# Patient Record
Sex: MALE | Race: BLACK OR AFRICAN AMERICAN | Employment: FULL TIME | URBAN - METROPOLITAN AREA
[De-identification: names, ages, dates, MRNs, and addresses within clinical notes are randomized per-mention and may not be internally consistent; named-entity substitution may affect disease eponyms.]

---

## 2018-12-20 ENCOUNTER — APPOINTMENT (OUTPATIENT)
Dept: CT IMAGING | Age: 43
End: 2018-12-20
Attending: EMERGENCY MEDICINE
Payer: COMMERCIAL

## 2018-12-20 ENCOUNTER — HOSPITAL ENCOUNTER (OUTPATIENT)
Age: 43
Discharge: HOME | End: 2018-12-20
Attending: EMERGENCY MEDICINE
Payer: COMMERCIAL

## 2018-12-20 VITALS
SYSTOLIC BLOOD PRESSURE: 129 MMHG | HEART RATE: 94 BPM | DIASTOLIC BLOOD PRESSURE: 83 MMHG | RESPIRATION RATE: 16 BRPM | TEMPERATURE: 97 F | OXYGEN SATURATION: 100 %

## 2018-12-20 DIAGNOSIS — N39.0 URINARY TRACT INFECTION WITH HEMATURIA, SITE UNSPECIFIED: Primary | ICD-10-CM

## 2018-12-20 DIAGNOSIS — R31.9 URINARY TRACT INFECTION WITH HEMATURIA, SITE UNSPECIFIED: Primary | ICD-10-CM

## 2018-12-20 PROCEDURE — 99204 OFFICE O/P NEW MOD 45 MIN: CPT

## 2018-12-20 PROCEDURE — 87086 URINE CULTURE/COLONY COUNT: CPT | Performed by: EMERGENCY MEDICINE

## 2018-12-20 PROCEDURE — 81002 URINALYSIS NONAUTO W/O SCOPE: CPT | Performed by: EMERGENCY MEDICINE

## 2018-12-20 RX ORDER — KETOROLAC TROMETHAMINE 30 MG/ML
30 INJECTION, SOLUTION INTRAMUSCULAR; INTRAVENOUS ONCE
Status: DISCONTINUED | OUTPATIENT
Start: 2018-12-20 | End: 2018-12-20

## 2018-12-20 RX ORDER — SULFAMETHOXAZOLE AND TRIMETHOPRIM 800; 160 MG/1; MG/1
1 TABLET ORAL 2 TIMES DAILY
Qty: 6 TABLET | Refills: 0 | Status: SHIPPED | OUTPATIENT
Start: 2018-12-20 | End: 2018-12-23

## 2018-12-21 NOTE — ED INITIAL ASSESSMENT (HPI)
C/o urinary burning sensation at the end of the stream since Monday. Noted some blood in the urine on Monday and today.  Left groin pain at the end of urinary stream.

## 2018-12-21 NOTE — ED PROVIDER NOTES
Patient Seen in: [facility] [place] [facility]    History   Patient presents with:  Urinary Symptoms (urologic)    Stated Complaint: painful urination    HPI    This is a 25-year-old male that complains of dysuria since Monday.   Patient states at nondistended. Normoactive bowel sounds. No rebound. No guarding. : Circumcised male. No penile rash or lesions. No testicular pain or swelling. Minimal left angular lymph lymphadenopathy. EXTREMITIES: Warm with brisk capillary refill.        ED [employer]

## 2024-10-03 ENCOUNTER — EKG ENCOUNTER (OUTPATIENT)
Dept: LAB | Age: 49
End: 2024-10-03
Attending: NURSE PRACTITIONER
Payer: MEDICAID

## 2024-10-03 DIAGNOSIS — R00.2 PALPITATIONS: Primary | ICD-10-CM

## 2024-10-03 PROCEDURE — 93005 ELECTROCARDIOGRAM TRACING: CPT

## 2024-10-03 PROCEDURE — 93010 ELECTROCARDIOGRAM REPORT: CPT | Performed by: INTERNAL MEDICINE

## 2024-10-04 LAB
ATRIAL RATE: 90 BPM
P AXIS: 53 DEGREES
P-R INTERVAL: 170 MS
Q-T INTERVAL: 318 MS
QRS DURATION: 78 MS
QTC CALCULATION (BEZET): 389 MS
R AXIS: 38 DEGREES
T AXIS: 17 DEGREES
VENTRICULAR RATE: 90 BPM